# Patient Record
Sex: FEMALE | URBAN - METROPOLITAN AREA
[De-identification: names, ages, dates, MRNs, and addresses within clinical notes are randomized per-mention and may not be internally consistent; named-entity substitution may affect disease eponyms.]

---

## 2022-11-16 ENCOUNTER — OFFICE VISIT (OUTPATIENT)
Dept: URBAN - METROPOLITAN AREA CLINIC 105 | Facility: CLINIC | Age: 52
End: 2022-11-16

## 2023-06-13 ENCOUNTER — WEB ENCOUNTER (OUTPATIENT)
Dept: URBAN - METROPOLITAN AREA CLINIC 92 | Facility: CLINIC | Age: 53
End: 2023-06-13

## 2023-06-14 ENCOUNTER — OFFICE VISIT (OUTPATIENT)
Dept: URBAN - METROPOLITAN AREA CLINIC 92 | Facility: CLINIC | Age: 53
End: 2023-06-14
Payer: COMMERCIAL

## 2023-06-14 VITALS
BODY MASS INDEX: 21 KG/M2 | SYSTOLIC BLOOD PRESSURE: 97 MMHG | HEIGHT: 64 IN | HEART RATE: 71 BPM | DIASTOLIC BLOOD PRESSURE: 65 MMHG | TEMPERATURE: 97.1 F | WEIGHT: 123 LBS

## 2023-06-14 DIAGNOSIS — R27.8 DYSSYNERGIA: ICD-10-CM

## 2023-06-14 DIAGNOSIS — R19.5 LOOSE STOOLS: ICD-10-CM

## 2023-06-14 PROBLEM — 39384006: Status: ACTIVE | Noted: 2023-06-14

## 2023-06-14 PROCEDURE — 99204 OFFICE O/P NEW MOD 45 MIN: CPT | Performed by: INTERNAL MEDICINE

## 2023-06-14 NOTE — HPI-TODAY'S VISIT:
52yF with no sig PMH who presents with soft stool x one year. Takes no medications, but does take Ritual supplements. Does not adhere to specific diet. No weight loss. No nocturnal stools. She has one stool per day, and notes incomplete evacuation. Stool is soft, not liquid. No blood in the stool. Colonoscopy 2021 normal, told to repeat in 10 years, no fam hx of CRC. Has had two vaginal deliveries. No urinary incontinence or painful sex. No celiac disease in the family. No abd pain or nausea vomiting. Had recent TSH testing that was reportedly negative.

## 2023-06-15 LAB
IMMUNOGLOBULIN A: 217
INTERPRETATION: (no result)
TISSUE TRANSGLUTAMINASE AB, IGA: <1